# Patient Record
Sex: MALE | Race: WHITE | Employment: FULL TIME | URBAN - METROPOLITAN AREA
[De-identification: names, ages, dates, MRNs, and addresses within clinical notes are randomized per-mention and may not be internally consistent; named-entity substitution may affect disease eponyms.]

---

## 2020-12-01 ENCOUNTER — OFFICE VISIT (OUTPATIENT)
Dept: SURGERY | Age: 30
End: 2020-12-01
Payer: COMMERCIAL

## 2020-12-01 VITALS
HEART RATE: 63 BPM | DIASTOLIC BLOOD PRESSURE: 72 MMHG | WEIGHT: 146.4 LBS | HEIGHT: 66 IN | SYSTOLIC BLOOD PRESSURE: 117 MMHG | OXYGEN SATURATION: 98 % | RESPIRATION RATE: 16 BRPM | BODY MASS INDEX: 23.53 KG/M2 | TEMPERATURE: 96.6 F

## 2020-12-01 DIAGNOSIS — L72.9 SUBCUTANEOUS CYST: Primary | ICD-10-CM

## 2020-12-01 PROCEDURE — 99202 OFFICE O/P NEW SF 15 MIN: CPT | Performed by: SURGERY

## 2020-12-01 RX ORDER — MONTELUKAST SODIUM 10 MG/1
10 TABLET ORAL DAILY
COMMUNITY

## 2020-12-01 RX ORDER — ATORVASTATIN CALCIUM 40 MG/1
TABLET, FILM COATED ORAL DAILY
COMMUNITY

## 2020-12-01 NOTE — PROGRESS NOTES
Chief Complaint   Patient presents with    Skin Problem     Seen at the request of zelda Gibbons left shoulder cyst.

## 2020-12-01 NOTE — Clinical Note
12/1/20 Patient: Grace Avalos YOB: 1990 Date of Visit: 12/1/2020 Marline Ledbetter MD 
[address] [facility] [id number] VIA In Basket Dear Marline Ledbetter MD, Thank you for referring Mr. Grace Avalos to  [facility] for evaluation. My notes for this consultation are attached. If you have questions, please do not hesitate to call me. I look forward to following your patient along with you.  
 
 
Sincerely, 
 
Kimberlee Burnett MD

## 2020-12-02 NOTE — PROGRESS NOTES
To: Nathanael Santoyo MD    From: Bogdan Figueroa MD    Thank you for sending [facility] to see us. Please note that this dictation was completed with [employer]ona[employer], the computer voice recognition software. Quite often unanticipated grammatical, syntax, homophones, and other interpretive errors are inadvertently transcribed by the software. Please disregard these errors. Please excuse any errors that have escaped final proofreading. Encounter Date: 12/1/2020  History and Physical    Assessment:   Subcutaneous cystic lesion on the left upper arm. Intermittently tender especially with exercise. Body mass index is 23.99 kg/m². Plan:   I recommended excisional biopsy. Risks including bleeding and infection were explained to the patient. The patient expressed understanding of the risks, and all questions were answered to the patient's satisfaction. HPI:   [facility] is a 31 y.o. male who is seen in consultation at the request of Nathanael Santoyo MD for evaluation of a bump on his outer left upper arm. He says it varies in size from time to time. It is usually about half an inch in size. He says he gets swollen but does not drain. He has noticed that it becomes more tender after exercising. Its been there for at least 7 years. No history of other lesions such as this. Past Medical History:   Diagnosis Date    Environmental allergies     Hypercholesterolemia      History reviewed. No pertinent surgical history.    Family History   Problem Relation Age of Onset    Hypertension Mother     Stroke Mother     Cancer Mother     Cancer Maternal Grandmother     Stroke Maternal Grandmother     Cancer Maternal Grandfather     Cancer Paternal Grandmother     Cancer Paternal Grandfather      Social History     Tobacco Use    Smoking status: Never Smoker    Smokeless tobacco: Never Used   Substance Use Topics    Alcohol use: Yes      Current Outpatient Medications Medication Sig    atorvastatin (Lipitor) 40 mg tablet Take  by mouth daily.  montelukast (Singulair) 10 mg tablet Take 10 mg by mouth daily. No current facility-administered medications for this visit. Allergies:  No Known Allergies    Review of Systems:  10 systems reviewed. See scanned sheet in \"Media\" section. See HPI for pertinent positives and negatives. Objective:     Visit Vitals  /72 (BP 1 Location: Right arm, BP Patient Position: Sitting)   Pulse 63   Temp (!) 96.6 °F (35.9 °C) (Oral)   Resp 16   Ht 5' 5.5\" (1.664 m)   Wt 66.4 kg (146 lb 6.4 oz)   SpO2 98%   BMI 23.99 kg/m²       Physical Exam:  General appearance  Alert, cooperative, no distress, appears stated age   HEENT Anicteric           Lungs   Clear to auscultation bilaterally   Heart  Regular rate and rhythm. Abdomen   Soft, non-tender. Extremities no cyanosis or edema   Pulses 2+ right radial       Lymph nodes No palpable axillary LAD. Neurologic Without overt sensory or motor deficit     Focused exam of the left upper arm reveals a 1.5 cm subcutaneous nodule. On ultrasound this appears to be a cystic lesion. There is no erythema. .    Signed By: Walker Hitchcock MD     December 1, 2020

## 2020-12-07 ENCOUNTER — OFFICE VISIT (OUTPATIENT)
Dept: SURGERY | Age: 30
End: 2020-12-07
Payer: COMMERCIAL

## 2020-12-07 VITALS
SYSTOLIC BLOOD PRESSURE: 127 MMHG | OXYGEN SATURATION: 100 % | TEMPERATURE: 97.5 F | HEART RATE: 69 BPM | DIASTOLIC BLOOD PRESSURE: 75 MMHG | RESPIRATION RATE: 18 BRPM | WEIGHT: 144.1 LBS | HEIGHT: 66 IN | BODY MASS INDEX: 23.16 KG/M2

## 2020-12-07 DIAGNOSIS — R22.9 SUBCUTANEOUS MASS: Primary | ICD-10-CM

## 2020-12-07 DIAGNOSIS — M79.89 SOFT TISSUE MASS: Primary | ICD-10-CM

## 2020-12-07 PROCEDURE — 24075 EXC ARM/ELBOW LES SC < 3 CM: CPT | Performed by: SURGERY

## 2020-12-07 NOTE — Clinical Note
12/7/20 Patient: Erick Garcia YOB: 1990 Date of Visit: 12/7/2020 Shanon Abad MD 
[address] [facility] [address] VIA In Basket Dear Shanon Abad MD, Thank you for referring Mr. Erick Garcia to  [facility] for evaluation. My notes for this consultation are attached. If you have questions, please do not hesitate to call me. I look forward to following your patient along with you.  
 
 
Sincerely, 
 
Diana Renee MD

## 2020-12-07 NOTE — PROGRESS NOTES
Excision Procedure Note    Procedure Date: 12/7/2020    Pre-operative diagnosis:  Left upper arm subcutaneous mass  Post-operative diagnosis:  Left upper extremity subcutaneous angio-lipomatous mass  Procedure:  Excision of above    Specimen size:  1cm     Time out at performed by me (see consent form):  * Patient was identified by name and date of birth   * Agreement on procedure being performed was verified  * Risks and Benefits explained to the patient  * Procedure site verified and marked as necessary  * Patient was positioned for comfort  * Consent was signed and verified    Anesthesia: Local    Procedure Details: The area was prepped and draped in the usual manner. Local anesthesia was infiltrated into the skin and soft tissues surrounding the lesion. An incision was made. This was taken down into subcutaneous tissues with blunt and sharp dissection and the lesion was enucleated. It  was sent for pathologic evaluation. The cavity was irrigated with saline. The incision was closed with a 3-0 Vicryl. A single 3-0 Chronic was used for hemostasis at one end. Glue dressing was then applied. Estimated Blood Loss:  minimal    Disposition:  Procedure well tolerated. Post-procedure pain scale: 0/10.     Signed By: Rosana Dixon MD

## 2020-12-07 NOTE — PROGRESS NOTES
Chief Complaint   Patient presents with    Procedure     Cyst, left shoulder. 1. Have you been to the ER, urgent care clinic since your last visit? Hospitalized since your last visit? No    2. Have you seen or consulted any other health care providers outside of the [address] [place] [address] since your last visit? Include any pap smears or colon screening. No    Pt states he has a Vasovagal reaction when giving blood and is unsure of his reaction to this procedure.

## 2020-12-14 LAB
CPT CODES, 490044: NORMAL
CPT DISCLAIMER: NORMAL
CYTOLOGY SPEC DOC CYTO: NORMAL
DIAGNOSIS SYNOPSIS:: NORMAL
DX ICD CODE: NORMAL
DX ICD CODE: NORMAL
PATH REPORT.GROSS SPEC: NORMAL
PATH REPORT.MICROSCOPIC SPEC OTHER STN: NORMAL
PATH REPORT.RELEVANT HX SPEC: NORMAL
PATHOLOGIST NAME: NORMAL
PDF IMAGE, 807507: NORMAL
SPECIMEN SOURCE: NORMAL

## 2024-06-12 ENCOUNTER — HOSPITAL ENCOUNTER (OUTPATIENT)
Facility: HOSPITAL | Age: 34
Setting detail: RECURRING SERIES
Discharge: HOME | End: 2024-06-15
Payer: COMMERCIAL

## 2024-06-12 PROCEDURE — 97535 SELF CARE MNGMENT TRAINING: CPT

## 2024-06-12 PROCEDURE — 97162 PT EVAL MOD COMPLEX 30 MIN: CPT

## 2024-06-12 PROCEDURE — 97110 THERAPEUTIC EXERCISES: CPT

## 2024-06-12 NOTE — THERAPY EVALUATION
Patient will be able to demonstrate improvement in ankle inv and ev AROM to WNL < 2/10  pain in order to walk around yard without pain  Pt will be able to demonstrate SL squat without pain or excessive genu valgus to reduced pain and stress to healing tissues  Pt will be able to demonstrate 170 deg of R should flexion without pain in order to reach overhead       Long Term Goals: To be accomplished in 16 treatments.  Patient will be able to squat and lunge x5  without HHA or excessive compensation in order to improve mobility with ADLs and lifting household objects  Patient will be able to demonstrate improvement in hip abd and ER strength to 4+/5 in order to improve upon gait mechanics to walk for 2 hours without pain   Pt will be able to demonstrate ankle DF ROM to 10 deg DF AROM to WNL without pain in order to reduced ankle and knee stress to return to walking and running to maintain cardiovascular fitness   Pt will be able to perform all yard work and Adls walking on unstable surfaced and pivoting with load without pain in knee or ankle  Patient will be able to reach overhead and lift >5# overhead without pain in order to put dishes away      Frequency / Duration: Patient to be seen 1-2 times per week for 16 treatments.    Patient/ Caregiver education and instruction: Diagnosis, prognosis, self care, activity modification, and exercises   [x]  Plan of care has been reviewed with ANNALISE Banuelos PT, DPT       6/12/2024       12:16 PM        ===================================================================  I certify that the above Therapy Services are being furnished while the patient is under my care. I agree with the treatment plan and certify that this therapy is necessary.    Physician's Signature:_________________________   DATE:_________   TIME:________                           Referral, Self    ** Signature, Date and Time must be completed for valid certification **  Please sign and fax

## 2024-06-18 ENCOUNTER — HOSPITAL ENCOUNTER (OUTPATIENT)
Facility: HOSPITAL | Age: 34
Setting detail: RECURRING SERIES
Discharge: HOME | End: 2024-06-21
Payer: COMMERCIAL

## 2024-06-18 PROCEDURE — 97112 NEUROMUSCULAR REEDUCATION: CPT

## 2024-06-18 PROCEDURE — 97110 THERAPEUTIC EXERCISES: CPT

## 2024-06-18 PROCEDURE — 97140 MANUAL THERAPY 1/> REGIONS: CPT

## 2024-06-18 NOTE — PROGRESS NOTES
Progress Note/Recertification    Good initial progress towards goals.       PLAN  Yes  Continue plan of care  Re-Cert Due: NA  []  Upgrade activities as tolerated  []  Discharge due to:  []  Other:      Aditya Banuelos, PT       6/18/2024       7:03 AM

## 2024-06-24 ENCOUNTER — HOSPITAL ENCOUNTER (OUTPATIENT)
Facility: HOSPITAL | Age: 34
Setting detail: RECURRING SERIES
Discharge: HOME | End: 2024-06-27
Payer: COMMERCIAL

## 2024-06-24 PROCEDURE — 97110 THERAPEUTIC EXERCISES: CPT

## 2024-06-24 PROCEDURE — 97140 MANUAL THERAPY 1/> REGIONS: CPT

## 2024-06-24 PROCEDURE — 97112 NEUROMUSCULAR REEDUCATION: CPT

## 2024-06-24 NOTE — PROGRESS NOTES
PHYSICAL THERAPY - MEDICARE DAILY TREATMENT NOTE (updated 3/23)      Date: 2024          Patient Name:  Que Gutiérrez :  1990   Medical   Diagnosis:  Pain in right shoulder [M25.511]  Pain in right knee [M25.561]  Muscle weakness (generalized) [M62.81]  Other abnormalities of gait and mobility [R26.89] Treatment Diagnosis:  M25.511  RIGHT SHOULDER PAIN M25.561  RIGHT KNEE PAIN and M25.571 RIGHT ANKLE PAIN and pain in the joint of the right foot   and  M62.81  GENERAL MUSCLE WEAKNESS and R26.89   Abnormalities of gait and mobility    Referral Source:  Referral, Self Insurance:   Payor: JAMAAL / Plan: [facility] / Product Type: *No Product type* /                     Patient  verified yes     Visit #   Current  / Total 2 16   Time   In / Out 7:00a 7:45a   Total Treatment Time 45   Total Timed Codes 45   1:1 Treatment Time 45       BC Totals Reminder:  bill using total billable   min of TIMED therapeutic procedures and modalities.   8-22 min = 1 unit; 23-37 min = 2 units; 38-52 min = 3 units; 53-67 min = 4 units; 68-82 min = 5 units            SUBJECTIVE    Pain Level (0-10 scale): 2    Any medication changes, allergies to medications, adverse drug reactions, diagnosis change, or new procedure performed?: [x] No    [] Yes (see summary sheet for update)  Medications: Verified on Patient Summary List    Subjective functional status/changes:     Patient reporting some soreness in the R knee after being on his feet a lot over the weekend. Feels pec stretch has been helpful.     OBJECTIVE      Therapeutic Procedures:  Tx Min Billable or 1:1 Min (if diff from Tx Min) Procedure, Rationale, Specifics   15  97396 Therapeutic Exercise (timed):  increase ROM, strength, coordination, balance, and proprioception to improve patient's ability to progress to PLOF and address remaining functional goals. (see flow sheet as applicable)     Details if applicable:     15  88932 Neuromuscular Re-Education (timed):

## 2024-06-28 ENCOUNTER — HOSPITAL ENCOUNTER (OUTPATIENT)
Facility: HOSPITAL | Age: 34
Setting detail: RECURRING SERIES
End: 2024-06-28
Payer: COMMERCIAL

## 2024-07-01 ENCOUNTER — HOSPITAL ENCOUNTER (OUTPATIENT)
Facility: HOSPITAL | Age: 34
Setting detail: RECURRING SERIES
Discharge: HOME | End: 2024-07-04
Payer: COMMERCIAL

## 2024-07-01 PROCEDURE — 97140 MANUAL THERAPY 1/> REGIONS: CPT

## 2024-07-01 PROCEDURE — 97110 THERAPEUTIC EXERCISES: CPT

## 2024-07-01 PROCEDURE — 97112 NEUROMUSCULAR REEDUCATION: CPT

## 2024-07-01 NOTE — PROGRESS NOTES
coordination, kinesthetic sense, posture, core stability and proprioception to improve patient's ability to develop conscious control of individual muscles and awareness of position of extremities in order to progress to PLOF and address remaining functional goals. (see flow sheet as applicable)     Details if applicable:     15  36536 Manual Therapy (timed):  decrease pain, increase ROM, increase tissue extensibility, decrease trigger points, and increase postural awareness to improve patient's ability to progress to PLOF and address remaining functional goals.  The manual therapy interventions were performed at a separate and distinct time from the therapeutic activities interventions . (see flow sheet as applicable)    Details if applicable:  talar swing with DF grade 3-4, TC distraction mob grade 5 with cavitation, navicular AP mob grade 3-4    45     Total Total         [x]  Patient Education billed concurrently with other procedures   [x] Review HEP    [] Progressed/Changed HEP, detail:    [] Other detail:         Other Objective/Functional Measures  Hypomobile navicular and TC - improved following MT.     Pain Level at end of session (0-10 scale): 0      Assessment   Pt tolerated treatment well. Pt tolerated additional volume of TC mobility and SL mid foot stability req min a cuing to avoid excessive pronation. Updated HEP.     Patient will continue to benefit from skilled PT / OT services to modify and progress therapeutic interventions, analyze and address functional mobility deficits, analyze and address ROM deficits, analyze and address strength deficits, analyze and address soft tissue restrictions, analyze and cue for proper movement patterns, analyze and modify for postural abnormalities, and instruct in home and community integration to address functional deficits and attain remaining goals.    Progress toward goals / Updated goals:  []  See Progress Note/Recertification    Good initial progress

## 2024-07-03 ENCOUNTER — HOSPITAL ENCOUNTER (OUTPATIENT)
Facility: HOSPITAL | Age: 34
Setting detail: RECURRING SERIES
Discharge: HOME | End: 2024-07-06
Payer: COMMERCIAL

## 2024-07-03 PROCEDURE — 97112 NEUROMUSCULAR REEDUCATION: CPT

## 2024-07-03 PROCEDURE — 97110 THERAPEUTIC EXERCISES: CPT

## 2024-07-03 NOTE — PROGRESS NOTES
improve balance, coordination, kinesthetic sense, posture, core stability and proprioception to improve patient's ability to develop conscious control of individual muscles and awareness of position of extremities in order to progress to PLOF and address remaining functional goals. (see flow sheet as applicable)     Details if applicable:       48111 Manual Therapy (timed):  decrease pain, increase ROM, increase tissue extensibility, decrease trigger points, and increase postural awareness to improve patient's ability to progress to PLOF and address remaining functional goals.  The manual therapy interventions were performed at a separate and distinct time from the therapeutic activities interventions . (see flow sheet as applicable)    Details if applicable:  talar swing with DF grade 3-4, TC distraction mob grade 5 with cavitation, navicular AP mob grade 3-4    45     Total Total         [x]  Patient Education billed concurrently with other procedures   [x] Review HEP    [] Progressed/Changed HEP, detail:    [] Other detail:         Other Objective/Functional Measures  Improving TC mobility and improving mid foot control     Pain Level at end of session (0-10 scale): 0      Assessment   Pt tolerated treatment well.  PT tolerated cont volume of SL stability, eccentric DF control, and glute strengthening without c/o    Patient will continue to benefit from skilled PT / OT services to modify and progress therapeutic interventions, analyze and address functional mobility deficits, analyze and address ROM deficits, analyze and address strength deficits, analyze and address soft tissue restrictions, analyze and cue for proper movement patterns, analyze and modify for postural abnormalities, and instruct in home and community integration to address functional deficits and attain remaining goals.    Progress toward goals / Updated goals:  []  See Progress Note/Recertification    Good initial progress towards goals.

## 2024-07-08 ENCOUNTER — APPOINTMENT (OUTPATIENT)
Facility: HOSPITAL | Age: 34
End: 2024-07-08
Payer: COMMERCIAL

## 2024-07-12 ENCOUNTER — HOSPITAL ENCOUNTER (OUTPATIENT)
Facility: HOSPITAL | Age: 34
Setting detail: RECURRING SERIES
Discharge: HOME | End: 2024-07-15
Payer: COMMERCIAL

## 2024-07-12 PROCEDURE — 97110 THERAPEUTIC EXERCISES: CPT

## 2024-07-12 PROCEDURE — 97112 NEUROMUSCULAR REEDUCATION: CPT

## 2024-07-12 PROCEDURE — 97140 MANUAL THERAPY 1/> REGIONS: CPT

## 2024-07-12 NOTE — PROGRESS NOTES
PHYSICAL THERAPY - MEDICARE DAILY TREATMENT NOTE (updated 3/23)      Date: 2024          Patient Name:  Que Gutiérrez :  1990   Medical   Diagnosis:  Pain in right shoulder [M25.511]  Pain in right knee [M25.561]  Muscle weakness (generalized) [M62.81]  Other abnormalities of gait and mobility [R26.89] Treatment Diagnosis:  M25.511  RIGHT SHOULDER PAIN M25.561  RIGHT KNEE PAIN and M25.571 RIGHT ANKLE PAIN and pain in the joint of the right foot   and  M62.81  GENERAL MUSCLE WEAKNESS and R26.89   Abnormalities of gait and mobility    Referral Source:  Referral, Self Insurance:   Payor: JAMAAL / Plan: [facility] / Product Type: *No Product type* /                     Patient  verified yes     Visit #   Current  / Total 7 16   Time   In / Out 7:00a 7:45a   Total Treatment Time 45   Total Timed Codes 45   1:1 Treatment Time 45       BC Totals Reminder:  bill using total billable   min of TIMED therapeutic procedures and modalities.   8-22 min = 1 unit; 23-37 min = 2 units; 38-52 min = 3 units; 53-67 min = 4 units; 68-82 min = 5 units            SUBJECTIVE    Pain Level (0-10 scale): 0    Any medication changes, allergies to medications, adverse drug reactions, diagnosis change, or new procedure performed?: [x] No    [] Yes (see summary sheet for update)  Medications: Verified on Patient Summary List    Subjective functional status/changes:       Patient reporting some improvements in ankle and knee pain. Has been able to do some biking without pain in the ankle. Cont to report R shoulder stiffness      OBJECTIVE      Therapeutic Procedures:  Tx Min Billable or 1:1 Min (if diff from Tx Min) Procedure, Rationale, Specifics   15  12510 Therapeutic Exercise (timed):  increase ROM, strength, coordination, balance, and proprioception to improve patient's ability to progress to PLOF and address remaining functional goals. (see flow sheet as applicable)     Details if applicable:     15  38938 Neuromuscular

## 2024-07-15 ENCOUNTER — APPOINTMENT (OUTPATIENT)
Facility: HOSPITAL | Age: 34
End: 2024-07-15
Payer: COMMERCIAL

## 2024-07-17 ENCOUNTER — APPOINTMENT (OUTPATIENT)
Facility: HOSPITAL | Age: 34
End: 2024-07-17
Payer: COMMERCIAL

## 2024-08-05 ENCOUNTER — HOSPITAL ENCOUNTER (OUTPATIENT)
Facility: HOSPITAL | Age: 34
Setting detail: RECURRING SERIES
Discharge: HOME | End: 2024-08-08
Payer: COMMERCIAL

## 2024-08-05 PROCEDURE — 97140 MANUAL THERAPY 1/> REGIONS: CPT

## 2024-08-05 PROCEDURE — 97112 NEUROMUSCULAR REEDUCATION: CPT

## 2024-08-05 PROCEDURE — 97110 THERAPEUTIC EXERCISES: CPT

## 2024-08-05 NOTE — PROGRESS NOTES
goals.       PLAN  Yes  Continue plan of care  Re-Cert Due: NA  []  Upgrade activities as tolerated  []  Discharge due to:  []  Other:      Aditya Banuelos PT, DPT       8/5/2024       8:49 AM

## 2024-08-12 ENCOUNTER — APPOINTMENT (OUTPATIENT)
Facility: HOSPITAL | Age: 34
End: 2024-08-12
Payer: COMMERCIAL

## 2024-08-22 ENCOUNTER — APPOINTMENT (OUTPATIENT)
Facility: HOSPITAL | Age: 34
End: 2024-08-22
Payer: COMMERCIAL

## 2024-08-29 ENCOUNTER — APPOINTMENT (OUTPATIENT)
Facility: HOSPITAL | Age: 34
End: 2024-08-29
Payer: COMMERCIAL

## 2024-09-05 ENCOUNTER — HOSPITAL ENCOUNTER (OUTPATIENT)
Facility: HOSPITAL | Age: 34
Setting detail: RECURRING SERIES
Discharge: HOME | End: 2024-09-08
Payer: COMMERCIAL

## 2024-09-05 PROCEDURE — 97112 NEUROMUSCULAR REEDUCATION: CPT

## 2024-09-05 PROCEDURE — 97110 THERAPEUTIC EXERCISES: CPT

## 2024-09-18 ENCOUNTER — HOSPITAL ENCOUNTER (OUTPATIENT)
Facility: HOSPITAL | Age: 34
Setting detail: RECURRING SERIES
Discharge: HOME | End: 2024-09-21
Payer: COMMERCIAL

## 2024-09-18 PROCEDURE — 97110 THERAPEUTIC EXERCISES: CPT

## 2024-09-18 PROCEDURE — 97112 NEUROMUSCULAR REEDUCATION: CPT

## 2024-09-23 ENCOUNTER — APPOINTMENT (OUTPATIENT)
Facility: HOSPITAL | Age: 34
End: 2024-09-23
Payer: COMMERCIAL

## 2024-09-30 ENCOUNTER — APPOINTMENT (OUTPATIENT)
Facility: HOSPITAL | Age: 34
End: 2024-09-30
Payer: COMMERCIAL

## 2024-10-30 ENCOUNTER — HOSPITAL ENCOUNTER (OUTPATIENT)
Facility: HOSPITAL | Age: 34
Setting detail: RECURRING SERIES
Discharge: HOME | End: 2024-11-02
Payer: COMMERCIAL

## 2024-10-30 PROCEDURE — 97140 MANUAL THERAPY 1/> REGIONS: CPT

## 2024-10-30 PROCEDURE — 97110 THERAPEUTIC EXERCISES: CPT

## 2024-10-30 PROCEDURE — 97112 NEUROMUSCULAR REEDUCATION: CPT

## 2024-10-30 NOTE — PROGRESS NOTES
Discharge due to:  []  Other:      Aditya Banuelos, PT, DPT       10/30/2024       5:31 PM

## 2024-11-04 ENCOUNTER — HOSPITAL ENCOUNTER (OUTPATIENT)
Facility: HOSPITAL | Age: 34
Setting detail: RECURRING SERIES
Discharge: HOME | End: 2024-11-07
Payer: COMMERCIAL

## 2024-11-04 PROCEDURE — 97140 MANUAL THERAPY 1/> REGIONS: CPT | Performed by: PHYSICAL THERAPIST

## 2024-11-04 PROCEDURE — 97110 THERAPEUTIC EXERCISES: CPT | Performed by: PHYSICAL THERAPIST

## 2024-11-04 PROCEDURE — 20560 NDL INSJ W/O NJX 1 OR 2 MUSC: CPT | Performed by: PHYSICAL THERAPIST

## 2024-11-04 NOTE — PROGRESS NOTES
interventions were performed at a separate and distinct time from the therapeutic activities interventions . (see flow sheet as applicable)    Details if applicable:   STM,  DTM UT, lat, UT stretch, lat stretch   15  59326 Therapeutic Exercise (timed):  increase ROM, strength, coordination, balance, and proprioception to improve patient's ability to progress to PLOF and address remaining functional goals. (see flow sheet as applicable)     Details if applicable:       77243 Neuromuscular Re-Education (timed):  improve balance, coordination, kinesthetic sense, posture, core stability and proprioception to improve patient's ability to develop conscious control of individual muscles and awareness of position of extremities in order to progress to PLOF and address remaining functional goals. (see flow sheet as applicable)     Details if applicable:     30     Total Total         [x]  Patient Education billed concurrently with other procedures   [x] Review HEP    [] Progressed/Changed HEP, detail:    [] Other detail:         Other Objective/Functional Measures      Pain Level at end of session (0-10 scale): 0      Assessment   Pt tolerated treatment well. Improvement in painful arch with decreased ant humeral head translation following DN and MT without adverse effect. Pt tolerated additional volume of shoulder girdle mobility, rtc strengthening, and scapular stability without c/o.       Patient will continue to benefit from skilled PT / OT services to modify and progress therapeutic interventions, analyze and address functional mobility deficits, analyze and address ROM deficits, analyze and address strength deficits, analyze and address soft tissue restrictions, analyze and cue for proper movement patterns, analyze and modify for postural abnormalities, and instruct in home and community integration to address functional deficits and attain remaining goals.    Progress toward goals / Updated goals:  []  See Progress

## 2024-11-11 ENCOUNTER — HOSPITAL ENCOUNTER (OUTPATIENT)
Facility: HOSPITAL | Age: 34
Setting detail: RECURRING SERIES
Discharge: HOME | End: 2024-11-14
Payer: COMMERCIAL

## 2024-11-11 PROCEDURE — 97110 THERAPEUTIC EXERCISES: CPT | Performed by: PHYSICAL THERAPIST

## 2024-11-11 PROCEDURE — 97112 NEUROMUSCULAR REEDUCATION: CPT | Performed by: PHYSICAL THERAPIST

## 2024-11-11 PROCEDURE — 20560 NDL INSJ W/O NJX 1 OR 2 MUSC: CPT | Performed by: PHYSICAL THERAPIST

## 2024-11-11 NOTE — PROGRESS NOTES
PHYSICAL THERAPY - MEDICARE DAILY TREATMENT NOTE (updated 3/23)      Date: 2024          Patient Name:  Que Gutiérrez :  1990   Medical   Diagnosis:  Pain in right shoulder [M25.511]  Pain in right knee [M25.561]  Muscle weakness (generalized) [M62.81]  Other abnormalities of gait and mobility [R26.89] Treatment Diagnosis:  M25.511  RIGHT SHOULDER PAIN M25.561  RIGHT KNEE PAIN and M25.571 RIGHT ANKLE PAIN and pain in the joint of the right foot   and  M62.81  GENERAL MUSCLE WEAKNESS and R26.89   Abnormalities of gait and mobility    Referral Source:  Referral, Self Insurance:   Payor: JAMAAL / Plan: [facility] / Product Type: *No Product type* /                     Patient  verified yes     Visit #   Current  / Total 12 16   Time   In / Out 5:00p 5:45p   Total Treatment Time 45   Total Timed Codes 45   1:1 Treatment Time 45      [facility] Totals Reminder:  bill using total billable   min of TIMED therapeutic procedures and modalities.   8-22 min = 1 unit; 23-37 min = 2 units; 38-52 min = 3 units; 53-67 min = 4 units; 68-82 min = 5 units            SUBJECTIVE    Pain Level (0-10 scale): 0    Any medication changes, allergies to medications, adverse drug reactions, diagnosis change, or new procedure performed?: [x] No    [] Yes (see summary sheet for update)  Medications: Verified on Patient Summary List    Subjective functional status/changes:       Good tolerance to dry needling with improvement in pain in the following days. Feels some achiness with resisted overhead       OBJECTIVE    15   min Dry Needling without E-Stim         (not to be included in total timed codes     or 1:1 Treatment Time)     min Dry Needling with E-Stim: Attended      Type:       Muscles:     min Dry Needling with E-Stim: Unattended      Type:       Muscles:      Billing:  [x]   Needle Insertion w/o Injection (1-2 muscles) (un-timed)                        []   Needle Insertion w/o Injection (3+ muscles)

## 2024-11-20 ENCOUNTER — APPOINTMENT (OUTPATIENT)
Facility: HOSPITAL | Age: 34
End: 2024-11-20
Payer: COMMERCIAL

## 2024-11-21 ENCOUNTER — HOSPITAL ENCOUNTER (OUTPATIENT)
Facility: HOSPITAL | Age: 34
Setting detail: RECURRING SERIES
Discharge: HOME | End: 2024-11-24
Payer: COMMERCIAL

## 2024-11-21 PROCEDURE — 97110 THERAPEUTIC EXERCISES: CPT | Performed by: PHYSICAL THERAPIST

## 2024-11-21 PROCEDURE — 20560 NDL INSJ W/O NJX 1 OR 2 MUSC: CPT | Performed by: PHYSICAL THERAPIST

## 2024-11-21 PROCEDURE — 97112 NEUROMUSCULAR REEDUCATION: CPT | Performed by: PHYSICAL THERAPIST

## 2024-11-21 PROCEDURE — 97140 MANUAL THERAPY 1/> REGIONS: CPT | Performed by: PHYSICAL THERAPIST

## 2024-11-21 NOTE — PROGRESS NOTES
PHYSICAL THERAPY - MEDICARE DAILY TREATMENT NOTE (updated 3/23)      Date: 2024          Patient Name:  Que Gutiérrez :  1990   Medical   Diagnosis:  Pain in right shoulder [M25.511]  Pain in right knee [M25.561]  Muscle weakness (generalized) [M62.81]  Other abnormalities of gait and mobility [R26.89] Treatment Diagnosis:  M25.511  RIGHT SHOULDER PAIN M25.561  RIGHT KNEE PAIN and M25.571 RIGHT ANKLE PAIN and pain in the joint of the right foot   and  M62.81  GENERAL MUSCLE WEAKNESS and R26.89   Abnormalities of gait and mobility    Referral Source:  Referral, Self Insurance:   Payor: JAMAAL / Plan: [facility] / Product Type: *No Product type* /                     Patient  verified yes     Visit #   Current  / Total 13 16   Time   In / Out 12:00p 12:45   Total Treatment Time 45   Total Timed Codes 45   1:1 Treatment Time 45      [facility] Totals Reminder:  bill using total billable   min of TIMED therapeutic procedures and modalities.   8-22 min = 1 unit; 23-37 min = 2 units; 38-52 min = 3 units; 53-67 min = 4 units; 68-82 min = 5 units            SUBJECTIVE    Pain Level (0-10 scale): 9    Any medication changes, allergies to medications, adverse drug reactions, diagnosis change, or new procedure performed?: [x] No    [] Yes (see summary sheet for update)  Medications: Verified on Patient Summary List    Subjective functional status/changes:       Pt reporting good improvement in shoulder pajn for about a week but noticed increased R sided UT and neck tightness after a weekend of travel.       OBJECTIVE    10   min Dry Needling without E-Stim         (not to be included in total timed codes     or 1:1 Treatment Time)     min Dry Needling with E-Stim: Attended      Type:       Muscles:     min Dry Needling with E-Stim: Unattended      Type:       Muscles:      Billing:  [x]   Needle Insertion w/o Injection (1-2 muscles) (un-timed)                        []   Needle Insertion w/o Injection

## 2024-11-25 ENCOUNTER — HOSPITAL ENCOUNTER (OUTPATIENT)
Facility: HOSPITAL | Age: 34
Setting detail: RECURRING SERIES
Discharge: HOME | End: 2024-11-28
Payer: COMMERCIAL

## 2024-11-25 PROCEDURE — 97110 THERAPEUTIC EXERCISES: CPT | Performed by: PHYSICAL THERAPIST

## 2024-11-25 PROCEDURE — 20560 NDL INSJ W/O NJX 1 OR 2 MUSC: CPT | Performed by: PHYSICAL THERAPIST

## 2024-11-25 PROCEDURE — 97140 MANUAL THERAPY 1/> REGIONS: CPT | Performed by: PHYSICAL THERAPIST

## 2024-11-25 NOTE — PROGRESS NOTES
PHYSICAL THERAPY - MEDICARE DAILY TREATMENT NOTE (updated 3/23)      Date: 2024          Patient Name:  Que Gutiérrez :  1990   Medical   Diagnosis:  Pain in right shoulder [M25.511]  Pain in right knee [M25.561]  Muscle weakness (generalized) [M62.81]  Other abnormalities of gait and mobility [R26.89] Treatment Diagnosis:  M25.511  RIGHT SHOULDER PAIN M25.561  RIGHT KNEE PAIN and M25.571 RIGHT ANKLE PAIN and pain in the joint of the right foot   and  M62.81  GENERAL MUSCLE WEAKNESS and R26.89   Abnormalities of gait and mobility    Referral Source:  Referral, Self Insurance:   Payor: JAMAAL / Plan: [facility] / Product Type: *No Product type* /                     Patient  verified yes     Visit #   Current  / Total 14 16   Time   In / Out 5:00p 5:45p   Total Treatment Time 45   Total Timed Codes 45   1:1 Treatment Time 45      [facility] Totals Reminder:  bill using total billable   min of TIMED therapeutic procedures and modalities.   8-22 min = 1 unit; 23-37 min = 2 units; 38-52 min = 3 units; 53-67 min = 4 units; 68-82 min = 5 units            SUBJECTIVE    Pain Level (0-10 scale): 9    Any medication changes, allergies to medications, adverse drug reactions, diagnosis change, or new procedure performed?: [x] No    [] Yes (see summary sheet for update)  Medications: Verified on Patient Summary List    Subjective functional status/changes:       Pt reporting improvement in neck ORM but cont to feel soreness with end ranges of motion      OBJECTIVE    15   min Dry Needling without E-Stim         (not to be included in total timed codes     or 1:1 Treatment Time)     min Dry Needling with E-Stim: Attended      Type:       Muscles:     min Dry Needling with E-Stim: Unattended      Type:       Muscles:      Billing:  [x]   Needle Insertion w/o Injection (1-2 muscles) (un-timed)                        []   Needle Insertion w/o Injection (3+ muscles) (un-timed)         [x]  Script obtained from

## 2024-12-09 ENCOUNTER — HOSPITAL ENCOUNTER (OUTPATIENT)
Facility: HOSPITAL | Age: 34
Setting detail: RECURRING SERIES
Discharge: HOME | End: 2024-12-12

## 2024-12-09 PROCEDURE — 97140 MANUAL THERAPY 1/> REGIONS: CPT | Performed by: PHYSICAL THERAPIST

## 2024-12-09 PROCEDURE — 97112 NEUROMUSCULAR REEDUCATION: CPT | Performed by: PHYSICAL THERAPIST

## 2024-12-09 PROCEDURE — 97110 THERAPEUTIC EXERCISES: CPT | Performed by: PHYSICAL THERAPIST

## 2024-12-16 ENCOUNTER — HOSPITAL ENCOUNTER (OUTPATIENT)
Facility: HOSPITAL | Age: 34
Setting detail: RECURRING SERIES
Discharge: HOME | End: 2024-12-19
Payer: COMMERCIAL

## 2024-12-16 PROCEDURE — 97110 THERAPEUTIC EXERCISES: CPT | Performed by: PHYSICAL THERAPIST

## 2024-12-16 PROCEDURE — 97112 NEUROMUSCULAR REEDUCATION: CPT | Performed by: PHYSICAL THERAPIST

## 2024-12-16 PROCEDURE — 97140 MANUAL THERAPY 1/> REGIONS: CPT | Performed by: PHYSICAL THERAPIST

## 2024-12-16 NOTE — PROGRESS NOTES
PHYSICAL THERAPY - MEDICARE DAILY TREATMENT NOTE (updated 3/23)      Date: 2024          Patient Name:  Que Gutiérrez :  1990   Medical   Diagnosis:  Pain in right shoulder [M25.511]  Pain in right knee [M25.561]  Muscle weakness (generalized) [M62.81]  Other abnormalities of gait and mobility [R26.89] Treatment Diagnosis:  M25.511  RIGHT SHOULDER PAIN M25.561  RIGHT KNEE PAIN and M25.571 RIGHT ANKLE PAIN and pain in the joint of the right foot   and  M62.81  GENERAL MUSCLE WEAKNESS and R26.89   Abnormalities of gait and mobility    Referral Source:  Referral, Self Insurance:   [place] / [facility] / Product Type: *No Product type* /                     Patient  verified yes     Visit #   Current  / Total 16 28   Time   In / Out 2:00p 2:45p   Total Treatment Time 45   Total Timed Codes 45   1:1 Treatment Time 45       [facility] Totals Reminder:  bill using total billable   min of TIMED therapeutic procedures and modalities.   8-22 min = 1 unit; 23-37 min = 2 units; 38-52 min = 3 units; 53-67 min = 4 units; 68-82 min = 5 units            SUBJECTIVE    Pain Level (0-10 scale): 0    Any medication changes, allergies to medications, adverse drug reactions, diagnosis change, or new procedure performed?: [x] No    [] Yes (see summary sheet for update)  Medications: Verified on Patient Summary List    Subjective functional status/changes:       Patient reporting increased pain in the shoulder after running and participating in a cycling class. Noting soreness in the R periscapular area      OBJECTIVE      Therapeutic Procedures:  Tx Min Billable or 1:1 Min (if diff from Tx Min) Procedure, Rationale, Specifics   15  70138 Manual Therapy (timed):  decrease pain, increase ROM, increase tissue extensibility, decrease trigger points, and increase postural awareness to improve patient's ability to progress to PLOF and address remaining functional goals.  The manual therapy interventions were performed

## 2024-12-17 NOTE — PROGRESS NOTES
Physical Therapy at [facility],   a part of [place] [address] [facility], [address]  [place], [place] [address]  Phone: [phone]  Fax: [phone]  PHYSICAL THERAPY PROGRESS NOTE  Patient Name:  Que Gutiérrez :  1990   Treatment/Medical Diagnosis: Pain in right shoulder [M25.511]  Pain in right knee [M25.561]  Muscle weakness (generalized) [M62.81]  Other abnormalities of gait and mobility [R26.89]   Referral Source:  Referral, Self     Date of Initial Visit:  24 Attended Visits:  16 Missed Visits:  0     SUMMARY OF TREATMENT/ASSESSMENT:  Mr. Gutiérrez is progressing well with PT. Pt has reached  4/5 STGs and 4/5 LTGs. Pt continued to be limite din thoracic ROM, cervical ROM, UE and scapular strength, and postural/scapular control/stability and would benefit from continued skilled PT 1-2x a week for 12 visits to cont to progress towards remaining functional goals.     CURRENT STATUS/GOALS    Short Term Goals: To be accomplished in 8 treatments.  Patient will be ind with Hep without VC MET   Patient will be able to demonstrate improvement in R sh external rotation strength to 4/5 < 2/10 pain in order to dress without pain  MET   Patient will be able to demonstrate improvement in ankle inv and ev AROM to WNL < 2/10  pain in order to walk around yard without pain MET   Pt will be able to demonstrate SL squat without pain or excessive genu valgus to reduced pain and stress to healing tissues MET   Pt will be able to demonstrate 170 deg of R should flexion without pain in order to reach overhead  progressing    Long Term Goals: To be accomplished in 16 treatments.  Patient will be able to squat and lunge x5  without HHA or excessive compensation in order to improve mobility with ADLs and lifting household objects MET  Patient will be able to demonstrate improvement in hip abd and ER strength to 4+/5 in order to improve upon gait mechanics to walk for 2 hours without

## 2024-12-27 ENCOUNTER — HOSPITAL ENCOUNTER (OUTPATIENT)
Facility: HOSPITAL | Age: 34
Setting detail: RECURRING SERIES
Discharge: HOME | End: 2024-12-30
Payer: COMMERCIAL

## 2024-12-27 PROCEDURE — 97140 MANUAL THERAPY 1/> REGIONS: CPT | Performed by: PHYSICAL THERAPIST

## 2024-12-27 PROCEDURE — 97110 THERAPEUTIC EXERCISES: CPT | Performed by: PHYSICAL THERAPIST

## 2024-12-27 PROCEDURE — 97112 NEUROMUSCULAR REEDUCATION: CPT | Performed by: PHYSICAL THERAPIST

## 2024-12-27 NOTE — PROGRESS NOTES
PHYSICAL THERAPY - MEDICARE DAILY TREATMENT NOTE (updated 3/23)      Date: 2024          Patient Name:  Que Gutiérrez :  1990   Medical   Diagnosis:  Pain in right shoulder [M25.511]  Pain in right knee [M25.561]  Muscle weakness (generalized) [M62.81]  Other abnormalities of gait and mobility [R26.89] Treatment Diagnosis:  M25.511  RIGHT SHOULDER PAIN M25.561  RIGHT KNEE PAIN and M25.571 RIGHT ANKLE PAIN and pain in the joint of the right foot   and  M62.81  GENERAL MUSCLE WEAKNESS and R26.89   Abnormalities of gait and mobility    Referral Source:  Referral, Self Insurance:   [place] / [place]: [facility] / Product Type: *No Product type* /                     Patient  verified yes     Visit #   Current  / Total 17 28   Time   In / Out 11:00a 11:45a   Total Treatment Time 45   Total Timed Codes 45   1:1 Treatment Time 45       [facility] Totals Reminder:  bill using total billable   min of TIMED therapeutic procedures and modalities.   8-22 min = 1 unit; 23-37 min = 2 units; 38-52 min = 3 units; 53-67 min = 4 units; 68-82 min = 5 units            SUBJECTIVE    Pain Level (0-10 scale): 1    Any medication changes, allergies to medications, adverse drug reactions, diagnosis change, or new procedure performed?: [x] No    [] Yes (see summary sheet for update)  Medications: Verified on Patient Summary List    Subjective functional status/changes:       Patient noting improving periscapular pain with updated HEP       OBJECTIVE      Therapeutic Procedures:  Tx Min Billable or 1:1 Min (if diff from Tx Min) Procedure, Rationale, Specifics   15  64668 Manual Therapy (timed):  decrease pain, increase ROM, increase tissue extensibility, decrease trigger points, and increase postural awareness to improve patient's ability to progress to PLOF and address remaining functional goals.  The manual therapy interventions were performed at a separate and distinct time from the therapeutic activities interventions .

## 2024-12-30 ENCOUNTER — HOSPITAL ENCOUNTER (OUTPATIENT)
Facility: HOSPITAL | Age: 34
Setting detail: RECURRING SERIES
Discharge: HOME | End: 2025-01-02
Payer: COMMERCIAL

## 2024-12-30 PROCEDURE — 97140 MANUAL THERAPY 1/> REGIONS: CPT | Performed by: PHYSICAL THERAPIST

## 2024-12-30 PROCEDURE — 97110 THERAPEUTIC EXERCISES: CPT | Performed by: PHYSICAL THERAPIST

## 2024-12-30 PROCEDURE — 97112 NEUROMUSCULAR REEDUCATION: CPT | Performed by: PHYSICAL THERAPIST

## 2024-12-30 NOTE — PROGRESS NOTES
PHYSICAL THERAPY - MEDICARE DAILY TREATMENT NOTE (updated 3/23)      Date: 2024          Patient Name:  Que Gutiérrez :  1990   Medical   Diagnosis:  Pain in right shoulder [M25.511]  Pain in right knee [M25.561]  Muscle weakness (generalized) [M62.81]  Other abnormalities of gait and mobility [R26.89] Treatment Diagnosis:  M25.511  RIGHT SHOULDER PAIN M25.561  RIGHT KNEE PAIN and M25.571 RIGHT ANKLE PAIN and pain in the joint of the right foot   and  M62.81  GENERAL MUSCLE WEAKNESS and R26.89   Abnormalities of gait and mobility    Referral Source:  Referral, Self Insurance:   [place] / Plan: [place] / Product Type: *No Product type* /                     Patient  verified yes     Visit #   Current  / Total 18 28   Time   In / Out 4:15p 4:55p   Total Treatment Time 40   Total Timed Codes 40   1:1 Treatment Time 40       [facility] Totals Reminder:  bill using total billable   min of TIMED therapeutic procedures and modalities.   8-22 min = 1 unit; 23-37 min = 2 units; 38-52 min = 3 units; 53-67 min = 4 units; 68-82 min = 5 units            SUBJECTIVE    Pain Level (0-10 scale): 0    Any medication changes, allergies to medications, adverse drug reactions, diagnosis change, or new procedure performed?: [x] No    [] Yes (see summary sheet for update)  Medications: Verified on Patient Summary List    Subjective functional status/changes:       Pt noting continued improvement in scapular and neck pain and stiffness      OBJECTIVE      Therapeutic Procedures:  Tx Min Billable or 1:1 Min (if diff from Tx Min) Procedure, Rationale, Specifics   10  14147 Manual Therapy (timed):  decrease pain, increase ROM, increase tissue extensibility, decrease trigger points, and increase postural awareness to improve patient's ability to progress to PLOF and address remaining functional goals.  The manual therapy interventions were performed at a separate and distinct time from the therapeutic activities

## 2025-01-13 ENCOUNTER — HOSPITAL ENCOUNTER (OUTPATIENT)
Facility: HOSPITAL | Age: 35
Setting detail: RECURRING SERIES
Discharge: HOME | End: 2025-01-16
Payer: COMMERCIAL

## 2025-01-13 PROCEDURE — 97112 NEUROMUSCULAR REEDUCATION: CPT | Performed by: PHYSICAL THERAPIST

## 2025-01-13 PROCEDURE — 97110 THERAPEUTIC EXERCISES: CPT | Performed by: PHYSICAL THERAPIST

## 2025-01-13 PROCEDURE — 97140 MANUAL THERAPY 1/> REGIONS: CPT | Performed by: PHYSICAL THERAPIST

## 2025-01-13 NOTE — PROGRESS NOTES
interventions were performed at a separate and distinct time from the therapeutic activities interventions . (see flow sheet as applicable)    Details if applicable:   STM, DTM thoracic paraspinals, rhomboids, LS upper traps, PA, UPA C4-7 grade 3-4 supine, T1-5 grade 3-4 prone   15  47032 Therapeutic Exercise (timed):  increase ROM, strength, coordination, balance, and proprioception to improve patient's ability to progress to PLOF and address remaining functional goals. (see flow sheet as applicable)     Details if applicable:     15  13799 Neuromuscular Re-Education (timed):  improve balance, coordination, kinesthetic sense, posture, core stability and proprioception to improve patient's ability to develop conscious control of individual muscles and awareness of position of extremities in order to progress to PLOF and address remaining functional goals. (see flow sheet as applicable)     Details if applicable:     40     Total Total         [x]  Patient Education billed concurrently with other procedures   [x] Review HEP    [] Progressed/Changed HEP, detail:    [] Other detail:         Other Objective/Functional Measures          Pain Level at end of session (0-10 scale): 0      Assessment   Pt tolerated treatment well.  Improvement in cervical and thoracic AROM following MT and mobility exercise. Good carryover with scapular stability today with reduced pain.     Patient will continue to benefit from skilled PT / OT services to modify and progress therapeutic interventions, analyze and address functional mobility deficits, analyze and address ROM deficits, analyze and address strength deficits, analyze and address soft tissue restrictions, analyze and cue for proper movement patterns, analyze and modify for postural abnormalities, and instruct in home and community integration to address functional deficits and attain remaining goals.    Progress toward goals / Updated goals:  []  See Progress

## 2025-01-20 ENCOUNTER — HOSPITAL ENCOUNTER (OUTPATIENT)
Facility: HOSPITAL | Age: 35
Setting detail: RECURRING SERIES
Discharge: HOME | End: 2025-01-23
Payer: COMMERCIAL

## 2025-01-20 PROCEDURE — 97140 MANUAL THERAPY 1/> REGIONS: CPT | Performed by: PHYSICAL THERAPIST

## 2025-01-20 PROCEDURE — 97112 NEUROMUSCULAR REEDUCATION: CPT | Performed by: PHYSICAL THERAPIST

## 2025-01-20 PROCEDURE — 97110 THERAPEUTIC EXERCISES: CPT | Performed by: PHYSICAL THERAPIST

## 2025-01-20 NOTE — PROGRESS NOTES
PHYSICAL THERAPY - MEDICARE DAILY TREATMENT NOTE (updated 3/23)      Date: 2025          Patient Name:  Que Gutiérrez :  1990   Medical   Diagnosis:  Pain in right shoulder [M25.511]  Pain in right knee [M25.561]  Muscle weakness (generalized) [M62.81]  Other abnormalities of gait and mobility [R26.89] Treatment Diagnosis:  M25.511  RIGHT SHOULDER PAIN M25.561  RIGHT KNEE PAIN and M25.571 RIGHT ANKLE PAIN and pain in the joint of the right foot   and  M62.81  GENERAL MUSCLE WEAKNESS and R26.89   Abnormalities of gait and mobility    Referral Source:  Referral, Self Insurance:   [place] / [place] [facility] / Product Type: *No Product type* /                     Patient  verified yes     Visit #   Current  / Total 20 28   Time   In / Out 5:00p 5:45p   Total Treatment Time 45   Total Timed Codes 45   1:1 Treatment Time 45       [facility] Totals Reminder:  bill using total billable   min of TIMED therapeutic procedures and modalities.   8-22 min = 1 unit; 23-37 min = 2 units; 38-52 min = 3 units; 53-67 min = 4 units; 68-82 min = 5 units            SUBJECTIVE    Pain Level (0-10 scale): 1    Any medication changes, allergies to medications, adverse drug reactions, diagnosis change, or new procedure performed?: [x] No    [] Yes (see summary sheet for update)  Medications: Verified on Patient Summary List    Subjective functional status/changes:       Pt reporting continued improvement in soreness despite more travel over the weekend. Cont to not eanteriro shoulder pain when reaching into the back seat of his car       OBJECTIVE      Therapeutic Procedures:  Tx Min Billable or 1:1 Min (if diff from Tx Min) Procedure, Rationale, Specifics   15  01267 Manual Therapy (timed):  decrease pain, increase ROM, increase tissue extensibility, decrease trigger points, and increase postural awareness to improve patient's ability to progress to PLOF and address remaining functional goals.  The manual therapy

## 2025-02-03 ENCOUNTER — APPOINTMENT (OUTPATIENT)
Facility: HOSPITAL | Age: 35
End: 2025-02-03
Payer: COMMERCIAL

## 2025-02-10 ENCOUNTER — APPOINTMENT (OUTPATIENT)
Facility: HOSPITAL | Age: 35
End: 2025-02-10
Payer: COMMERCIAL

## 2025-02-17 ENCOUNTER — HOSPITAL ENCOUNTER (OUTPATIENT)
Facility: HOSPITAL | Age: 35
Setting detail: RECURRING SERIES
Discharge: HOME | End: 2025-02-20
Payer: COMMERCIAL

## 2025-02-17 PROCEDURE — 97535 SELF CARE MNGMENT TRAINING: CPT

## 2025-02-17 PROCEDURE — 97110 THERAPEUTIC EXERCISES: CPT

## 2025-02-17 NOTE — PROGRESS NOTES
Physical Therapy at [facility],   a part of [facility]  [address], [address]  [place], [place] [address]  Phone: [phone]  Fax: [phone]    PHYSICAL THERAPY PROGRESS NOTE  Patient Name:  Que Gutiérrez :  1990   Treatment/Medical Diagnosis: Pain in right shoulder [M25.511]  Pain in right knee [M25.561]  Muscle weakness (generalized) [M62.81]  Other abnormalities of gait and mobility [R26.89]   Referral Source:  Referral, Self     Date of Initial Visit:  2024 Attended Visits:  21 Missed Visits:  0     SUMMARY OF TREATMENT/ASSESSMENT:  Pt was seen for 21 sessions s/p shoulder and knee pain, as well as weakness and gait deviations. Treatment included: Therapeutic Exercises, Therapeutic Activities, Neuromuscular Re-education, Manual Therapy, Gait Training, Self Care, modalities prn, pt education, and HEP.      CURRENT STATUS/GOALS:    Short Term Goals: To be accomplished in 8 treatments.  Patient will be ind with Hep without VC MET   Patient will be able to demonstrate improvement in R sh external rotation strength to 4/5 < 2/10 pain in order to dress without pain  MET   Patient will be able to demonstrate improvement in ankle inv and ev AROM to WNL < 2/10  pain in order to walk around yard without pain MET   Pt will be able to demonstrate SL squat without pain or excessive genu valgus to reduced pain and stress to healing tissues MET   Pt will be able to demonstrate 170 deg of R should flexion without pain in order to reach overhead  Progressing (able to perform, but has intermittent pain at end range)     Long Term Goals: To be accomplished in 16 treatments.  Patient will be able to squat and lunge x5  without HHA or excessive compensation in order to improve mobility with ADLs and lifting household objects MET  Patient will be able to demonstrate improvement in hip abd and ER strength to 4+/5 in order to improve upon gait mechanics to walk for 2 hours

## 2025-02-17 NOTE — PROGRESS NOTES
PHYSICAL THERAPY - MEDICARE DAILY TREATMENT NOTE (updated 3/23)      Date: 2025          Patient Name:  Que Gutiérrez :  1990   Medical   Diagnosis:  Pain in right shoulder [M25.511]  Pain in right knee [M25.561]  Muscle weakness (generalized) [M62.81]  Other abnormalities of gait and mobility [R26.89] Treatment Diagnosis:  M25.511  RIGHT SHOULDER PAIN M25.561  RIGHT KNEE PAIN and M25.571 RIGHT ANKLE PAIN and pain in the joint of the right foot   and  M62.81  GENERAL MUSCLE WEAKNESS and R26.89   Abnormalities of gait and mobility    Referral Source:  Referral, Self Insurance:   [place] / [place] [facility] [place] / Product Type: *No Product type* /                     Patient  verified yes     Visit #   Current  / Total 21 28   Time   In / Out 10:30 am 11:08am   Total Treatment Time 38 mins   Total Timed Codes 38 mins   1:1 Treatment Time 38 mins      [facility] Totals Reminder:  bill using total billable   min of TIMED therapeutic procedures and modalities.   8-22 min = 1 unit; 23-37 min = 2 units; 38-52 min = 3 units; 53-67 min = 4 units; 68-82 min = 5 units            SUBJECTIVE    Pain Level (0-10 scale): 4-5/10    Any medication changes, allergies to medications, adverse drug reactions, diagnosis change, or new procedure performed?: [x] No    [] Yes (see summary sheet for update)  Medications: Verified on Patient Summary List    Subjective functional status/changes:     Patient reports feeling okay today, describing pain as \"I can barely notice it.\" Pt reports being compliant w/ HEP as indicated, stating his LE strength and symptoms have improved a great deal since SOC.       OBJECTIVE      Therapeutic Procedures:  Tx Min Billable or 1:1 Min (if diff from Tx Min) Procedure, Rationale, Specifics   27  58924 Therapeutic Exercise (timed):  increase ROM, strength, coordination, balance, and proprioception to improve patient's ability to progress to PLOF and address remaining functional goals. (see flow

## 2025-02-24 ENCOUNTER — APPOINTMENT (OUTPATIENT)
Facility: HOSPITAL | Age: 35
End: 2025-02-24
Payer: COMMERCIAL